# Patient Record
Sex: FEMALE | Race: WHITE | URBAN - METROPOLITAN AREA
[De-identification: names, ages, dates, MRNs, and addresses within clinical notes are randomized per-mention and may not be internally consistent; named-entity substitution may affect disease eponyms.]

---

## 2021-05-28 ENCOUNTER — RECORDS - HEALTHEAST (OUTPATIENT)
Dept: ADMINISTRATIVE | Facility: CLINIC | Age: 56
End: 2021-05-28

## 2025-02-13 ENCOUNTER — LAB REQUISITION (OUTPATIENT)
Dept: LAB | Facility: CLINIC | Age: 60
End: 2025-02-13

## 2025-02-13 DIAGNOSIS — R68.82 DECREASED LIBIDO: ICD-10-CM

## 2025-02-13 DIAGNOSIS — Z13.1 ENCOUNTER FOR SCREENING FOR DIABETES MELLITUS: ICD-10-CM

## 2025-02-13 DIAGNOSIS — Z13.29 ENCOUNTER FOR SCREENING FOR OTHER SUSPECTED ENDOCRINE DISORDER: ICD-10-CM

## 2025-02-13 DIAGNOSIS — Z13.220 ENCOUNTER FOR SCREENING FOR LIPOID DISORDERS: ICD-10-CM

## 2025-02-13 LAB
ALBUMIN SERPL BCG-MCNC: 4.2 G/DL (ref 3.5–5.2)
ALP SERPL-CCNC: 75 U/L (ref 40–150)
ALT SERPL W P-5'-P-CCNC: 16 U/L (ref 0–50)
ANION GAP SERPL CALCULATED.3IONS-SCNC: 13 MMOL/L (ref 7–15)
AST SERPL W P-5'-P-CCNC: 21 U/L (ref 0–45)
BILIRUB SERPL-MCNC: 0.6 MG/DL
BUN SERPL-MCNC: 10.6 MG/DL (ref 6–20)
CALCIUM SERPL-MCNC: 9.5 MG/DL (ref 8.8–10.4)
CHLORIDE SERPL-SCNC: 105 MMOL/L (ref 98–107)
CHOLEST SERPL-MCNC: 249 MG/DL
CREAT SERPL-MCNC: 0.68 MG/DL (ref 0.51–0.95)
EGFRCR SERPLBLD CKD-EPI 2021: >90 ML/MIN/1.73M2
ERYTHROCYTE [DISTWIDTH] IN BLOOD BY AUTOMATED COUNT: 12.5 % (ref 10–15)
FASTING STATUS PATIENT QL REPORTED: YES
FASTING STATUS PATIENT QL REPORTED: YES
GLUCOSE SERPL-MCNC: 87 MG/DL (ref 70–99)
HCO3 SERPL-SCNC: 22 MMOL/L (ref 22–29)
HCT VFR BLD AUTO: 41.8 % (ref 35–47)
HDLC SERPL-MCNC: 70 MG/DL
HGB BLD-MCNC: 13.6 G/DL (ref 11.7–15.7)
LDLC SERPL CALC-MCNC: 161 MG/DL
MCH RBC QN AUTO: 30.6 PG (ref 26.5–33)
MCHC RBC AUTO-ENTMCNC: 32.5 G/DL (ref 31.5–36.5)
MCV RBC AUTO: 94 FL (ref 78–100)
NONHDLC SERPL-MCNC: 179 MG/DL
PLATELET # BLD AUTO: 297 10E3/UL (ref 150–450)
POTASSIUM SERPL-SCNC: 3.9 MMOL/L (ref 3.4–5.3)
PROT SERPL-MCNC: 6.7 G/DL (ref 6.4–8.3)
RBC # BLD AUTO: 4.44 10E6/UL (ref 3.8–5.2)
SHBG SERPL-SCNC: 30 NMOL/L (ref 30–135)
SODIUM SERPL-SCNC: 140 MMOL/L (ref 135–145)
TRIGL SERPL-MCNC: 91 MG/DL
TSH SERPL DL<=0.005 MIU/L-ACNC: 0.68 UIU/ML (ref 0.3–4.2)
WBC # BLD AUTO: 9.1 10E3/UL (ref 4–11)

## 2025-02-13 PROCEDURE — 84443 ASSAY THYROID STIM HORMONE: CPT | Performed by: OBSTETRICS & GYNECOLOGY

## 2025-02-13 PROCEDURE — 80053 COMPREHEN METABOLIC PANEL: CPT | Performed by: OBSTETRICS & GYNECOLOGY

## 2025-02-13 PROCEDURE — 84155 ASSAY OF PROTEIN SERUM: CPT | Performed by: OBSTETRICS & GYNECOLOGY

## 2025-02-13 PROCEDURE — 80061 LIPID PANEL: CPT | Performed by: OBSTETRICS & GYNECOLOGY

## 2025-02-13 PROCEDURE — 85048 AUTOMATED LEUKOCYTE COUNT: CPT | Performed by: OBSTETRICS & GYNECOLOGY

## 2025-02-13 PROCEDURE — 84270 ASSAY OF SEX HORMONE GLOBUL: CPT | Performed by: OBSTETRICS & GYNECOLOGY

## 2025-02-13 PROCEDURE — 85014 HEMATOCRIT: CPT | Performed by: OBSTETRICS & GYNECOLOGY

## 2025-02-15 LAB
TESTOST FREE SERPL-MCNC: 0.53 NG/DL
TESTOST SERPL-MCNC: 28 NG/DL (ref 8–60)

## 2025-04-22 NOTE — H&P (VIEW-ONLY)
[facility]      Preoperative Consultation   Brittanie Chawla   : 1965   Gender: female    Date of Encounter: 2025    Nursing Notes:   Lolita Richardson MA  2025  2:37 PM  Addendum  Chief Complaint   Patient presents with     Preoperative Exam     Brittanie Chawla is a 59 y.o. female (1965) who presents for preop evaluation undergoing wart removal    Date of Surgery: 25  Surgical Specialty: [facility]/Surgical Facility:  [facility]  Fax number:   [phone]   Surgery type: outpatient  Primary Physician: Ravi eFrnandez     Additional visit information (chief complaint/health maintenance) shared by patient: Patient states there is a spot on her left foot that she is concerned about     Health Maintenance Due   Topic Date Due     Hepatitis C screening for age 18-79  Never done     Pneumococcal series for age 50+ (1 of 2 - PCV) Never done     Zoster (shingles) series for age 50+ (1 of 2) Never done     Mammogram for age 45-75  2020     Depression screening for age 12+  2023     Lipids for age 45-75  2024     COVID-19 vaccine series (3 - 2024-25 season) 2024     BMI (ht and wt on same day) for age 18+  2024     Health maintenance reviewed with patient Yes      Patient presents for an in-person office visit: alone  Communication Method: Patient is active on [employer] and has been instructed that results/communications will be made via [employer]  If a phone call is needed, the preferred number is: Mobile   Home Phone [phone]   Mobile [phone]     May we leave a detailed message at this number? Yes    Lolita Richardson RMA                  2025          2:29 PM           History of Present Illness   60 yo with vulvadynia related to warts  History of Present Illness               Review of Systems   A comprehensive review of systems was negative except for items noted in HPI.    Patient Active Problem  List   Diagnosis Code     Unspecified essential hypertension I10     Other and unspecified hyperlipidemia E78.5     Urinary incontinence, stress N39.3     Right lumbar radiculopathy M54.16     Active smoker F17.200     Depression with anxiety F41.8     Partner relational problem Z63.0     ADHD (attention deficit hyperactivity disorder), combined type F90.2     PMDD (premenstrual dysphoric disorder) F32.81     Arthritis of carpometacarpal (CMC) joint of left thumb M18.12     Mass of left wrist R22.32     Pap smear for cervical cancer screening Z12.4     Current Outpatient Medications   Medication Sig     buPROPion 150 mg Extended-Release tablet Take 300 mg by mouth once daily.     clotrimazole-betamethasone 1%-0.05% cream Apply topically to affected area(s) two times daily for 7 days.     Prometrium 100 mg capsule Take 1 Capsule by mouth once daily.     SEMIWEEKLY patch estradiol 0.05 mg/24 hr SEMIWEEKLY transdermal patch Apply 1 patch twice a week to the skin by transdermal route.*     No current facility-administered medications for this visit.     Medications have been reviewed by me and are current to the best of my knowledge and ability.     Allergies   Allergen Reactions     Iodine Hives and Edema     Sulfa (Sulfonamide Antibiotics) Hives and Tongue Swelling     Adderall [Dextroamphetamine-Amphetamine] Other - Describe In Comment Field     Emotional-depression     Amoxicillin Hives     Ceftin [Cefuroxime] Hives     Cipro [Ciprofloxacin] Behavioral Disturbances     COGNITIVE      Citalopram Other - Describe In Comment Field     Depression/suicidal     Doxycycline Rash     itch     Erythromycin Nausea Only     Irbesartan Hives     Avapro     Lisinopril Cough     Strattera [Atomoxetine Hcl] GI Upset     Past Surgical History:   . Laterality Date     (IA) NE  DELIVERY ONLY      , ,      APPENDECTOMY  1973     DILATION AND CURETTAGE  2001     ENDOMETRIAL ABLATION  10/12/2007      EXAMINATION UNDER ANESTHESIA WITH EXCISION AND FULGERATION OF ANAL CONDYLOMA  09/01/2022    [facility] Dr. 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   Vaping Use     Vaping status: Never Used   Substance Use Topics     Alcohol use: Yes     Alcohol/week: 0.0 standard drinks of alcohol     Comment: 5 drinks per year     Drug use: No     Family History   Adopted: Yes   Problem Relation Age of Onset     Alcohol/Drug Father      Hypertension Mother      Cancer Maternal Grandfather         melanoma     Cancer-breast No Family History      Cancer-ovarian No Family History      Results              PAST DIFFICULTY WITH ANESTHESIA: Yes, personal hx with ablation she was hypotensive     Physical Exam   /72 (Cuff Site: Right Arm, Position: Sitting, Cuff Size: Adult Regular)   Pulse 72   Wt 59.5 kg (131 lb 2 oz)   LMP  (LMP Unknown)   SpO2 97%   BMI 26.48 kg/m   Body mass index is 26.48 kg/m .  Physical Exam            General Appearance: Pleasant, alert, appropriate appearance for age. No acute distress  Head Exam: Normocephalic, without obvious abnormality.  Eye Exam: Normal external eye, conjunctiva, lids, cornea. JOSUE.  Funduscopic Exam: Normal red reflex and fundoscopic exam.  Ear Exam: Normal TM's bilaterally. Normal auditory canals and external ears. Non-tender.  Nose Exam: Normal external nose, mucus membranes, and septum.  OroPharynx Exam: Dental hygiene adequate. Normal buccal mucosa. Normal pharynx.  Neck Exam: Supple, no masses or nodes.  Thyroid Exam: No nodules or enlargement.  Chest/Respiratory Exam: Normal chest wall and respirations. Clear to  auscultation.  Cardiovascular Exam: Regular rate and rhythm. S1, S2, no murmur, click, gallop, or rubs.  Gastrointestinal Exam: Soft, nontender, no abnormal masses or organomegaly.  Lymphatic Exam: Non-palpable nodes in neck, clavicular, axillary, or inguinal regions.  Musculoskeletal Exam: Back is straight and non-tender, full ROM of upper and lower extremities.  Neurologic Exam: Nonfocal, symmetric DTRs, normal gross motor, tone coordination and no tremor.  Psychiatric Exam: Alert and oriented, appropriate affect.       Assessment / Plan     Labs: no  ECG: no    ICD-10-CM    1. Preop examination  Z01.818       2. Other problems related to housing and economic circumstances  Z59.89       3. Other problems related to social environment  Z60.8       4. Rash  R21 clotrimazole-betamethasone 1%-0.05% cream        Assessment & Plan            Patient is cleared for planned procedure.   Electronically Signed by:   Ravi Fernandez MD    4/22/2025   2:48 PM    4/22/2025      The Pre-Op Tool    Recommendations      Low Risk Procedure    Cardiac History  No history of coronary artery disease           Labs  No routine labs indicated  EKG  Not indicated  Stress Testing  Not indicated    * Testing recommendations are intended to assist, but not direct, clinical decisions.            Labs  * Data supports elimination of  routine  laboratory testing in favor of focused,  indicated  testing based on medical co-morbidities. A 2009 study randomized 1061 patients undergoing ambulatory, non-cataract surgery to routine or to indicated testing. Perioperative adverse events were similar (Anesthesia & Analgesia 2009;108:467-75; Anesthesiol. Clin. 2016 Mar;34(1):43-58).  EKG  * The ACC/AHA recommends against obtaining routine EKGs in patients undergoing low risk surgeries, a class IIa recommendation (JACC. 2014;64(21);e1-76).     Session ID: [id number]  Endnotes and bibliography available upon  request: [email or web address]

## 2025-04-28 RX ORDER — ESTRADIOL 0.1 MG/G
CREAM VAGINAL
COMMUNITY

## 2025-04-28 RX ORDER — BUPROPION HYDROCHLORIDE 150 MG/1
300 TABLET ORAL
COMMUNITY
Start: 2024-12-02

## 2025-04-28 RX ORDER — ESTRADIOL 0.05 MG/D
1 PATCH, EXTENDED RELEASE TRANSDERMAL
COMMUNITY
Start: 2025-03-09

## 2025-04-28 RX ORDER — PROGESTERONE 100 MG/1
1 CAPSULE ORAL DAILY
COMMUNITY
Start: 2024-12-02

## 2025-04-29 ENCOUNTER — ANESTHESIA EVENT (OUTPATIENT)
Dept: SURGERY | Facility: AMBULATORY SURGERY CENTER | Age: 60
End: 2025-04-29

## 2025-04-30 ENCOUNTER — HOSPITAL ENCOUNTER (OUTPATIENT)
Facility: AMBULATORY SURGERY CENTER | Age: 60
Discharge: HOME | End: 2025-04-30
Attending: OBSTETRICS & GYNECOLOGY

## 2025-04-30 ENCOUNTER — ANESTHESIA (OUTPATIENT)
Dept: SURGERY | Facility: AMBULATORY SURGERY CENTER | Age: 60
End: 2025-04-30

## 2025-04-30 VITALS
SYSTOLIC BLOOD PRESSURE: 114 MMHG | OXYGEN SATURATION: 98 % | WEIGHT: 128.2 LBS | HEART RATE: 76 BPM | DIASTOLIC BLOOD PRESSURE: 67 MMHG | BODY MASS INDEX: 25.84 KG/M2 | TEMPERATURE: 96.8 F | HEIGHT: 59 IN | RESPIRATION RATE: 18 BRPM

## 2025-04-30 DIAGNOSIS — A63.0 GENITAL WARTS: ICD-10-CM

## 2025-04-30 RX ORDER — DEXAMETHASONE SODIUM PHOSPHATE 4 MG/ML
INJECTION, SOLUTION INTRA-ARTICULAR; INTRALESIONAL; INTRAMUSCULAR; INTRAVENOUS; SOFT TISSUE PRN
Status: DISCONTINUED | OUTPATIENT
Start: 2025-04-30 | End: 2025-04-30

## 2025-04-30 RX ORDER — NALOXONE HYDROCHLORIDE 0.4 MG/ML
0.1 INJECTION, SOLUTION INTRAMUSCULAR; INTRAVENOUS; SUBCUTANEOUS
Status: DISCONTINUED | OUTPATIENT
Start: 2025-04-30 | End: 2025-05-01 | Stop reason: HOSPADM

## 2025-04-30 RX ORDER — LIDOCAINE 40 MG/G
CREAM TOPICAL
Status: DISCONTINUED | OUTPATIENT
Start: 2025-04-30 | End: 2025-05-01 | Stop reason: HOSPADM

## 2025-04-30 RX ORDER — ACETAMINOPHEN 325 MG/1
975 TABLET ORAL ONCE
Status: DISCONTINUED | OUTPATIENT
Start: 2025-04-30 | End: 2025-04-30

## 2025-04-30 RX ORDER — FENTANYL CITRATE 50 UG/ML
INJECTION, SOLUTION INTRAMUSCULAR; INTRAVENOUS PRN
Status: DISCONTINUED | OUTPATIENT
Start: 2025-04-30 | End: 2025-04-30

## 2025-04-30 RX ORDER — ONDANSETRON 2 MG/ML
INJECTION INTRAMUSCULAR; INTRAVENOUS PRN
Status: DISCONTINUED | OUTPATIENT
Start: 2025-04-30 | End: 2025-04-30

## 2025-04-30 RX ORDER — GLYCOPYRROLATE 0.2 MG/ML
INJECTION, SOLUTION INTRAMUSCULAR; INTRAVENOUS PRN
Status: DISCONTINUED | OUTPATIENT
Start: 2025-04-30 | End: 2025-04-30

## 2025-04-30 RX ORDER — ACETAMINOPHEN 325 MG/1
975 TABLET ORAL ONCE
Status: COMPLETED | OUTPATIENT
Start: 2025-04-30 | End: 2025-04-30

## 2025-04-30 RX ORDER — DEXAMETHASONE SODIUM PHOSPHATE 4 MG/ML
4 INJECTION, SOLUTION INTRA-ARTICULAR; INTRALESIONAL; INTRAMUSCULAR; INTRAVENOUS; SOFT TISSUE
Status: DISCONTINUED | OUTPATIENT
Start: 2025-04-30 | End: 2025-05-01 | Stop reason: HOSPADM

## 2025-04-30 RX ORDER — PROPOFOL 10 MG/ML
INJECTION, EMULSION INTRAVENOUS CONTINUOUS PRN
Status: DISCONTINUED | OUTPATIENT
Start: 2025-04-30 | End: 2025-04-30

## 2025-04-30 RX ORDER — ONDANSETRON 2 MG/ML
4 INJECTION INTRAMUSCULAR; INTRAVENOUS EVERY 30 MIN PRN
Status: DISCONTINUED | OUTPATIENT
Start: 2025-04-30 | End: 2025-05-01 | Stop reason: HOSPADM

## 2025-04-30 RX ORDER — SODIUM CHLORIDE, SODIUM LACTATE, POTASSIUM CHLORIDE, CALCIUM CHLORIDE 600; 310; 30; 20 MG/100ML; MG/100ML; MG/100ML; MG/100ML
INJECTION, SOLUTION INTRAVENOUS CONTINUOUS
Status: DISCONTINUED | OUTPATIENT
Start: 2025-04-30 | End: 2025-05-01 | Stop reason: HOSPADM

## 2025-04-30 RX ORDER — IBUPROFEN 800 MG/1
800 TABLET, FILM COATED ORAL ONCE
Status: CANCELLED | OUTPATIENT
Start: 2025-04-30 | End: 2025-04-30

## 2025-04-30 RX ORDER — SILVER SULFADIAZINE 10 MG/G
CREAM TOPICAL PRN
Status: DISCONTINUED | OUTPATIENT
Start: 2025-04-30 | End: 2025-04-30 | Stop reason: HOSPADM

## 2025-04-30 RX ORDER — ONDANSETRON 4 MG/1
4 TABLET, ORALLY DISINTEGRATING ORAL EVERY 30 MIN PRN
Status: DISCONTINUED | OUTPATIENT
Start: 2025-04-30 | End: 2025-05-01 | Stop reason: HOSPADM

## 2025-04-30 RX ORDER — OXYCODONE HYDROCHLORIDE 10 MG/1
10 TABLET ORAL
Status: DISCONTINUED | OUTPATIENT
Start: 2025-04-30 | End: 2025-05-01 | Stop reason: HOSPADM

## 2025-04-30 RX ORDER — KETOROLAC TROMETHAMINE 30 MG/ML
INJECTION, SOLUTION INTRAMUSCULAR; INTRAVENOUS PRN
Status: DISCONTINUED | OUTPATIENT
Start: 2025-04-30 | End: 2025-04-30

## 2025-04-30 RX ORDER — ACETAMINOPHEN 325 MG/1
975 TABLET ORAL ONCE
Status: CANCELLED | OUTPATIENT
Start: 2025-04-30 | End: 2025-04-30

## 2025-04-30 RX ORDER — PROPOFOL 10 MG/ML
INJECTION, EMULSION INTRAVENOUS PRN
Status: DISCONTINUED | OUTPATIENT
Start: 2025-04-30 | End: 2025-04-30

## 2025-04-30 RX ORDER — LIDOCAINE HYDROCHLORIDE 20 MG/ML
INJECTION, SOLUTION INFILTRATION; PERINEURAL PRN
Status: DISCONTINUED | OUTPATIENT
Start: 2025-04-30 | End: 2025-04-30

## 2025-04-30 RX ORDER — OXYCODONE HYDROCHLORIDE 5 MG/1
5 TABLET ORAL
Status: CANCELLED | OUTPATIENT
Start: 2025-04-30

## 2025-04-30 RX ORDER — OXYCODONE HYDROCHLORIDE 5 MG/1
5 TABLET ORAL
Status: DISCONTINUED | OUTPATIENT
Start: 2025-04-30 | End: 2025-05-01 | Stop reason: HOSPADM

## 2025-04-30 RX ADMIN — FENTANYL CITRATE 25 MCG: 50 INJECTION, SOLUTION INTRAMUSCULAR; INTRAVENOUS at 08:15

## 2025-04-30 RX ADMIN — LIDOCAINE HYDROCHLORIDE 50 MG: 20 INJECTION, SOLUTION INFILTRATION; PERINEURAL at 08:10

## 2025-04-30 RX ADMIN — ONDANSETRON 4 MG: 2 INJECTION INTRAMUSCULAR; INTRAVENOUS at 08:10

## 2025-04-30 RX ADMIN — PROPOFOL 10 MG: 10 INJECTION, EMULSION INTRAVENOUS at 08:12

## 2025-04-30 RX ADMIN — GLYCOPYRROLATE 0.2 MG: 0.2 INJECTION, SOLUTION INTRAMUSCULAR; INTRAVENOUS at 08:10

## 2025-04-30 RX ADMIN — SODIUM CHLORIDE, SODIUM LACTATE, POTASSIUM CHLORIDE, CALCIUM CHLORIDE: 600; 310; 30; 20 INJECTION, SOLUTION INTRAVENOUS at 07:49

## 2025-04-30 RX ADMIN — KETOROLAC TROMETHAMINE 15 MG: 30 INJECTION, SOLUTION INTRAMUSCULAR; INTRAVENOUS at 08:40

## 2025-04-30 RX ADMIN — DEXAMETHASONE SODIUM PHOSPHATE 4 MG: 4 INJECTION, SOLUTION INTRA-ARTICULAR; INTRALESIONAL; INTRAMUSCULAR; INTRAVENOUS; SOFT TISSUE at 08:10

## 2025-04-30 RX ADMIN — PROPOFOL 300 MCG/KG/MIN: 10 INJECTION, EMULSION INTRAVENOUS at 08:12

## 2025-04-30 RX ADMIN — ACETAMINOPHEN 975 MG: 325 TABLET ORAL at 07:50

## 2025-04-30 ASSESSMENT — LIFESTYLE VARIABLES: TOBACCO_USE: 1

## 2025-04-30 NOTE — ANESTHESIA CARE TRANSFER NOTE
Patient: Brittanie Chawla    Procedure: Procedure(s):  CARBON DIOXIDE LASER OF PERIANAL LESIONS, PERIANAL LESIONS EXCISION, VULVAR BIOPSY       Diagnosis: Genital warts [A63.0]  Diagnosis Additional Information: No value filed.    Anesthesia Type:   MAC     Note:    Oropharynx: oropharynx clear of all foreign objects  Level of Consciousness: drowsy  Oxygen Supplementation: face mask  Level of Supplemental Oxygen (L/min / FiO2): 99  Independent Airway: airway patency satisfactory and stable  Dentition: dentition unchanged  Vital Signs Stable: post-procedure vital signs reviewed and stable  Report to RN Given: handoff report given  Patient transferred to: Phase II    Handoff Report: Identifed the Patient, Identified the Reponsible Provider, Reviewed the pertinent medical history, Discussed the surgical course, Reviewed Intra-OP anesthesia mangement and issues during anesthesia, Set expectations for post-procedure period and Allowed opportunity for questions and acknowledgement of understanding      Vitals:  Vitals Value Taken Time   /56    Temp 37C    Pulse 70    Resp 14    SpO2 99        Electronically Signed By: ISMA Ramos CRNA  April 30, 2025  8:50 AM

## 2025-04-30 NOTE — INTERVAL H&P NOTE
"I have reviewed the surgical (or preoperative) H&P that is linked to this encounter, and examined the patient. There are no significant changes    Clinical Conditions Present on Arrival:  Clinically Significant Risk Factors Present on Admission                       # Overweight: Estimated body mass index is 25.89 kg/m  as calculated from the following:    Height as of this encounter: 1.499 m (4' 11\").    Weight as of this encounter: 58.2 kg (128 lb 3.2 oz).       "

## 2025-04-30 NOTE — ANESTHESIA PREPROCEDURE EVALUATION
Anesthesia Pre-Procedure Evaluation    Patient: Brittanie Chawla   MRN: [id number] : 1965        Procedure : Procedure(s):  CARBON DIOXIDE LASER OF PERIANAL LESIONS, PERIANAL LESIONS EXCISION          Past Medical History:   Diagnosis Date     Hypertension       Past Surgical History:   Procedure Laterality Date     APPENDECTOMY       CHOLECYSTECTOMY       COLONOSCOPY       GYN SURGERY      d&c     HERNIA REPAIR      umbilical      Allergies   Allergen Reactions     Iodine Swelling and Hives     Amphetamine-Dextroamphetamine Unknown     Atomoxetine Unknown     Cefuroxime Hives     Cephalosporins      Citalopram Other (See Comments) and Unknown     Depression/suicidal     Erythromycin Nausea     Irbesartan Hives     Avapro     Lisinopril Cough and Unknown     Penicillins Hives     Sulfa Antibiotics      Doxycycline Rash and Unknown     itch      Social History     Tobacco Use     Smoking status: Every Day     Current packs/day: 0.50     Average packs/day: 0.5 packs/day for 42.3 years (21.2 ttl pk-yrs)     Types: Cigarettes     Start date:      Smokeless tobacco: Not on file   Substance Use Topics     Alcohol use: Yes     Comment: 0-1 drinks/ month      Wt Readings from Last 1 Encounters:   25 59.9 kg (132 lb)        Anesthesia Evaluation   Pt has had prior anesthetic.     History of anesthetic complications   Severe hypotension post D and C years ago..    ROS/MED HX  ENT/Pulmonary:     (+)                tobacco use, Current use,                       Neurologic:  - neg neurologic ROS     Cardiovascular:     (+) Dyslipidemia hypertension- -   -  - -                                   (-) murmur   METS/Exercise Tolerance: >4 METS    Hematologic:  - neg hematologic  ROS     Musculoskeletal: Comment:  Genital warts      GI/Hepatic:  - neg GI/hepatic ROS  (-) GERD   Renal/Genitourinary:  - neg Renal ROS     Endo:  - neg endo ROS     Psychiatric/Substance Use: Comment: ADHD    (+) psychiatric history  "anxiety and depression       Infectious Disease:       Malignancy:       Other:            Physical Exam    Airway        Mallampati: II   TM distance: > 3 FB   Neck ROM: full   Mouth opening: > 3 cm    Respiratory Devices and Support         Dental     Comment: Good        Cardiovascular   cardiovascular exam normal       Rhythm and rate: regular and normal (-) no murmur    Pulmonary   pulmonary exam normal            OUTSIDE LABS:  CBC:   Lab Results   Component Value Date    WBC 9.1 02/13/2025    HGB 13.6 02/13/2025    HCT 41.8 02/13/2025     02/13/2025     BMP:   Lab Results   Component Value Date     02/13/2025    POTASSIUM 3.9 02/13/2025    CHLORIDE 105 02/13/2025    CO2 22 02/13/2025    BUN 10.6 02/13/2025    CR 0.68 02/13/2025    GLC 87 02/13/2025     COAGS: No results found for: \"PTT\", \"INR\", \"FIBR\"  POC: No results found for: \"BGM\", \"HCG\", \"HCGS\"  HEPATIC:   Lab Results   Component Value Date    ALBUMIN 4.2 02/13/2025    PROTTOTAL 6.7 02/13/2025    ALT 16 02/13/2025    AST 21 02/13/2025    ALKPHOS 75 02/13/2025    BILITOTAL 0.6 02/13/2025     OTHER:   Lab Results   Component Value Date    A1C 5.8 (H) 02/13/2025    YANIRA 9.5 02/13/2025    TSH 0.68 02/13/2025       Anesthesia Plan    ASA Status:  2    NPO Status:  NPO Appropriate    Anesthesia Type: MAC.              Consents    Anesthesia Plan(s) and associated risks, benefits, and realistic alternatives discussed. Questions answered and patient/representative(s) expressed understanding.     - Discussed: Risks, Benefits and Alternatives for BOTH SEDATION and the PROCEDURE were discussed     - Discussed with:  Patient       - Patient is DNR/DNI Status: No          Postoperative Care    Pain management: Multi-modal analgesia.   PONV prophylaxis: Ondansetron (or other 5HT-3)     Comments:               Tabatha Andres MD    Clinically Significant Risk Factors Present on Admission                             # Overweight: Estimated body mass index " "is 26.66 kg/m  as calculated from the following:    Height as of this encounter: 1.499 m (4' 11\").    Weight as of this encounter: 59.9 kg (132 lb).                "

## 2025-04-30 NOTE — OP NOTE
OPERATIVE NOTE    Procedure date: 4/30/2025    Pre-procedure Dx: Perianal warts, vulvar dermatosis  Post- Procedure Dx: Same    Procedure:CO2 laser of perianal warts, perianal lesion excision, vulvar biopsy    Surgeon: Caitie Crespo MD    Anesthesia: MAC with local  EBL: 1 mL    Complications: None   Findings: Numerous perianal lesions. Hypopigmented vestibule, otherwise much improved vulvar dermatosis.   Specimens: Perianal lesions, vestibule biopsy    Indications: Brittanie Chawla is a 59 year old who presented with a vulvar dermatosis, itching, was given clobetasol and had resolution/improvement of the vulvar dermatosis, but perianal wart-like lesions appeared. These were painful and bothersome to her and she had previously had treatment with cryo years ago, she desired laser management.    Procedure: The patient was moved onto the OR table in supine position. MAC anesthesia was administered. The patient was placed in dorsal lithotomy position with yellow fin stirrups.  A sponge soaked in ascetic acid solution was placed against the vulva and perianal area.  The CO2 laser was attached to the handpiece, wet blue towels placed around the operating field and time-out performed. Several perianal lesions were elevated with forceps and cut at the base to debulk the amount of lesion to laser. The laser was set to 7W continuous and the areas of perianal lesions ablated with the laser. After wiping the eschar away with moist sponge, the dermal adipose tissue was noted, demonstrating adequate depth of ablation. Hemostasis noted. 1% lidocaine was infiltrated at the end of the procedure perianally.   Then the hypopigmented area on the vestibule was infiltrated with 1% lidocaine and 3 mm punch biopsy obtained. CO2 laser was used to make the base hemostatic. Silvadene cream applied to the operative sites. Patient tolerated the procedure well.    Caitie Crespo MD   4/30/2025 9:15 AM

## 2025-04-30 NOTE — ANESTHESIA POSTPROCEDURE EVALUATION
Patient: Brittanie Chawla    Procedure: Procedure(s):  CARBON DIOXIDE LASER OF PERIANAL LESIONS, PERIANAL LESIONS EXCISION, VULVAR BIOPSY       Anesthesia Type:  MAC    Note:  Disposition: Outpatient   Postop Pain Control: Uneventful            Sign Out: Well controlled pain   PONV: No   Neuro/Psych: Uneventful            Sign Out: Acceptable/Baseline neuro status   Airway/Respiratory: Uneventful            Sign Out: Acceptable/Baseline resp. status   CV/Hemodynamics: Uneventful            Sign Out: Acceptable CV status; No obvious hypovolemia; No obvious fluid overload   Other NRE: NONE   DID A NON-ROUTINE EVENT OCCUR? No       Last vitals:  Vitals Value Taken Time   /67 04/30/25 0900   Temp 96.8  F (36  C) 04/30/25 0850   Pulse 81 04/30/25 0909   Resp 18 04/30/25 0850   SpO2 98 % 04/30/25 0909   Vitals shown include unfiled device data.    Electronically Signed By: Tabatha Andres MD  April 30, 2025  9:14 AM

## 2025-04-30 NOTE — DISCHARGE INSTRUCTIONS
You have received 975 mg of Acetaminophen (Tylenol) at 7:50 AM. Please do not take an additional dose of Tylenol until after 1:50 PM     Do not exceed 4,000 mg of acetaminophen during a 24 hour period and keep in mind that acetaminophen can also be found in many over-the-counter cold medications as well as narcotics that may be given for pain.     If you have any questions or concerns regarding your procedure please contact Dr. Crespo, her office number is [phone]    [facility]   Adult Discharge Orders & Instructions     For 24 hours after surgery    Get plenty of rest.  A responsible adult must stay with you for at least 24 hours after you leave the hospital.   Do not drive or use heavy equipment.  If you have weakness or tingling, don't drive or use heavy equipment until this feeling goes away.  Do not drink alcohol.  Avoid strenuous or risky activities.  Ask for help when climbing stairs.   You may feel lightheaded.  IF so, sit for a few minutes before standing.  Have someone help you get up.   If you have nausea (feel sick to your stomach): Drink only clear liquids such as apple juice, ginger ale, broth or 7-Up.  Rest may also help.  Be sure to drink enough fluids.  Move to a regular diet as you feel able.  You may have a slight fever. Call the doctor if your fever is over 100 F (37.7 C) (taken under the tongue) or lasts longer than 24 hours.  You may have a dry mouth, a sore throat, muscle aches or trouble sleeping.  These should go away after 24 hours.  Do not make important or legal decisions.   Call your doctor for any of the followin.  Signs of infection (fever, growing tenderness at the surgery site, a large amount of drainage or bleeding, severe pain, foul-smelling drainage, redness, swelling).    2. It has been over 8 to 10 hours since surgery and you are still not able to urinate (pass water).    3.  Headache for over 24 hours.